# Patient Record
Sex: MALE | Race: WHITE | Employment: PART TIME | URBAN - NONMETROPOLITAN AREA
[De-identification: names, ages, dates, MRNs, and addresses within clinical notes are randomized per-mention and may not be internally consistent; named-entity substitution may affect disease eponyms.]

---
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
## 2021-06-24 ENCOUNTER — PROCEDURE VISIT (OUTPATIENT)
Dept: PODIATRY | Age: 21
End: 2021-06-24
Payer: COMMERCIAL

## 2021-06-24 VITALS
BODY MASS INDEX: 40.43 KG/M2 | HEIGHT: 74 IN | DIASTOLIC BLOOD PRESSURE: 80 MMHG | WEIGHT: 315 LBS | HEART RATE: 96 BPM | SYSTOLIC BLOOD PRESSURE: 148 MMHG

## 2021-06-24 DIAGNOSIS — L03.032 PARONYCHIA, TOE, LEFT: ICD-10-CM

## 2021-06-24 DIAGNOSIS — L60.0 OC (ONYCHOCRYPTOSIS): Primary | ICD-10-CM

## 2021-06-24 DIAGNOSIS — L92.9 GRANULOMA OF GREAT TOE: ICD-10-CM

## 2021-06-24 PROCEDURE — 99203 OFFICE O/P NEW LOW 30 MIN: CPT | Performed by: PODIATRIST

## 2021-06-24 PROCEDURE — 11750 EXCISION NAIL&NAIL MATRIX: CPT | Performed by: PODIATRIST

## 2021-06-24 RX ORDER — CEPHALEXIN 500 MG/1
500 CAPSULE ORAL 3 TIMES DAILY
Qty: 30 CAPSULE | Refills: 0 | Status: SHIPPED | OUTPATIENT
Start: 2021-06-24 | End: 2021-07-04

## 2021-06-24 NOTE — PROGRESS NOTES
Subjective:  Elizabeth Valderrama is a 22 y.o. male who presents to the office today complaining of an ingrown nail. Symptoms began 2 month(s) ago. Patient relates pain is Present. Pain is rated 8 out of 10 and is described as waxing and waning, severe. Treatments prior to today's visit include: soaking. Currently denies F/C/N/V. No Known Allergies    History reviewed. No pertinent past medical history. Prior to Admission medications    Not on File     ROS: All 14 ROS systems reviewed and pertinent positives noted above, all others negative. Objective:  Patient is alert and oriented. Vascular: DP and PT pulses palpable 2/4, bilateral.  CFT <3 seconds, bilateral.  Hair growth present to the level of the digits, bilateral.  Edema absent, bilateral.  Varicosities absent, bilateral. Erythema absent, bilateral. Distal Rubor absent bilateral.  Temperature within normal limits bilateral. Hyperpigmentation absent bilateral. No atrophic skin. Neuro: Protective sensation is intact. Reflexes WNL. Musculoskeletal:  Pain present upon palpation of left, lateral, medial, hallux. Muscle strength 5/5, Bilateral. within normal limits medial longitudinal arch, Bilateral. ROM WNL. Integument: Onychocryptosis present left, lateral, medial, hallux. Granuloma is present left, lateral, medial, hallux. Paronychia changes with drainage and crust are present left, hallux. Skin color, texture, turgor normal. No rashes or lesions. .    Assessment:   Diagnosis Orders   1. OC (onychocryptosis)     2. Paronychia, toe, left     3. Granuloma of great toe           Plan:  Patient was educated on all treatment options. Risks and complications were discussed with the patient and they opted for a phenol matrixectomy nail procedure on the left, lateral, medial, hallux. Verbal and signed consent took place. A total of 3cc 1% lidocaine plain was used to anesthetize the left, lateral, medial, hallux.   It was then prepped and draped in the usual sterile manner. Anesthesia was checked and was adequate. The left, lateral, medial, hallux nail border and matrix was removed. No remaining nail spicules. Three consecutive 30 seconds applications of 10% phenol were then applied to the nail matrix with an applicator. Rinsed with normal saline. A dry sterile dressing of silvadene or antibiotic ointment with telfa and coban took place. Patient will leave dry and intact for 24 hours then start soaking bid in warm soapy water or epsom salts then apply the ointment and a band aid for the first week then continue once per day for the second week. Patient will use OTC anti-inflammatory or tylenol PRN for pain. Post nail procedure instructions were given. Any signs of infections and patient should be see back in the office immediately. Orders Placed This Encounter   Medications    silver sulfADIAZINE (SILVADENE) 1 % cream     Sig: Apply topically daily. Dispense:  30 g     Refill:  1     Orders Placed This Encounter   Medications    silver sulfADIAZINE (SILVADENE) 1 % cream     Sig: Apply topically daily. Dispense:  30 g     Refill:  1    cephALEXin (KEFLEX) 500 MG capsule     Sig: Take 1 capsule by mouth 3 times daily for 10 days     Dispense:  30 capsule     Refill:  0       Patient had low level medical decision making overall. Patient is acute uncomplicated condition. No new testing. Overall low risk of morbidity with current treatment course. Decision was made regarding minor procedure but no risk factors.

## 2023-07-21 ENCOUNTER — OFFICE VISIT (OUTPATIENT)
Dept: PRIMARY CARE CLINIC | Age: 23
End: 2023-07-21

## 2023-07-21 VITALS
OXYGEN SATURATION: 98 % | BODY MASS INDEX: 36.34 KG/M2 | SYSTOLIC BLOOD PRESSURE: 112 MMHG | WEIGHT: 283 LBS | HEART RATE: 74 BPM | TEMPERATURE: 98 F | DIASTOLIC BLOOD PRESSURE: 74 MMHG

## 2023-07-21 DIAGNOSIS — L03.213 PRESEPTAL CELLULITIS: Primary | ICD-10-CM

## 2023-07-21 PROCEDURE — 99202 OFFICE O/P NEW SF 15 MIN: CPT | Performed by: STUDENT IN AN ORGANIZED HEALTH CARE EDUCATION/TRAINING PROGRAM

## 2023-07-21 PROCEDURE — 99203 OFFICE O/P NEW LOW 30 MIN: CPT | Performed by: STUDENT IN AN ORGANIZED HEALTH CARE EDUCATION/TRAINING PROGRAM

## 2023-07-21 RX ORDER — AMOXICILLIN AND CLAVULANATE POTASSIUM 875; 125 MG/1; MG/1
1 TABLET, FILM COATED ORAL 2 TIMES DAILY
Qty: 20 TABLET | Refills: 0 | Status: SHIPPED | OUTPATIENT
Start: 2023-07-21 | End: 2023-07-31

## 2023-07-21 ASSESSMENT — ENCOUNTER SYMPTOMS
TROUBLE SWALLOWING: 0
CONSTIPATION: 0
SHORTNESS OF BREATH: 0
ABDOMINAL PAIN: 0
NAUSEA: 0
EYE PAIN: 0
BLOOD IN STOOL: 0
VOMITING: 0
DIARRHEA: 0
COUGH: 0

## 2023-07-21 ASSESSMENT — PATIENT HEALTH QUESTIONNAIRE - PHQ9
2. FEELING DOWN, DEPRESSED OR HOPELESS: 0
1. LITTLE INTEREST OR PLEASURE IN DOING THINGS: 0
SUM OF ALL RESPONSES TO PHQ QUESTIONS 1-9: 0
SUM OF ALL RESPONSES TO PHQ9 QUESTIONS 1 & 2: 0
SUM OF ALL RESPONSES TO PHQ QUESTIONS 1-9: 0

## 2023-07-21 NOTE — PROGRESS NOTES
[facility]             [address], [address]                        Telephone [phone]             Fax [phone]       Clair Gomez  :  2000  Age:  26 y.o. MRN:  [id number]  Date of visit:  2023     Assessment and Plan:    1. Preseptal cellulitis  Likely preseptal cellulitis. We will start Augmentin twice daily for total 10 days. Recommend return to the urgent care if symptoms worsen or fail to improve. No visual changes or pain with eye movement suggestive of orbital cellulitis at this time. - amoxicillin-clavulanate (AUGMENTIN) 875-125 MG per tablet; Take 1 tablet by mouth 2 times daily for 10 days  Dispense: 20 tablet; Refill: 0      Subjective:    Clair Gomez is a 26 y.o. male who presents to [facility] today (2023) for evaluation of:  Cellulitis (Right side of face )    45-year-old male who presents to the urgent care today for evaluation of right-sided facial swelling and erythema. States that this has been ongoing for the past several days. States that he initially had a pimple that he tried to pop on the right side of his face and states that 2 days later he has noticed swelling around his right eye. Chief Complaint   Patient presents with    Cellulitis     Right side of face      He has the following problem list:  Patient Active Problem List   Diagnosis    Ingrowing nail        Review of Systems   Constitutional:  Negative for chills, fatigue and fever. HENT:  Negative for ear pain, postnasal drip and trouble swallowing. Eyes:  Negative for pain and visual disturbance. Respiratory:  Negative for cough and shortness of breath. Cardiovascular:  Negative for chest pain and palpitations. Gastrointestinal:  Negative for abdominal pain, blood in stool, constipation, diarrhea, nausea and vomiting. Genitourinary:  Negative for dysuria and urgency.